# Patient Record
Sex: MALE | Race: WHITE
[De-identification: names, ages, dates, MRNs, and addresses within clinical notes are randomized per-mention and may not be internally consistent; named-entity substitution may affect disease eponyms.]

---

## 2023-08-07 ENCOUNTER — HOSPITAL ENCOUNTER (INPATIENT)
Dept: HOSPITAL 97 - ER | Age: 58
LOS: 2 days | Discharge: HOME | DRG: 871 | End: 2023-08-09
Attending: HOSPITALIST | Admitting: HOSPITALIST
Payer: COMMERCIAL

## 2023-08-07 VITALS — BODY MASS INDEX: 20.3 KG/M2

## 2023-08-07 DIAGNOSIS — K70.0: ICD-10-CM

## 2023-08-07 DIAGNOSIS — E87.1: ICD-10-CM

## 2023-08-07 DIAGNOSIS — M79.89: ICD-10-CM

## 2023-08-07 DIAGNOSIS — D50.9: ICD-10-CM

## 2023-08-07 DIAGNOSIS — K62.4: ICD-10-CM

## 2023-08-07 DIAGNOSIS — Z79.899: ICD-10-CM

## 2023-08-07 DIAGNOSIS — E43: ICD-10-CM

## 2023-08-07 DIAGNOSIS — R74.01: ICD-10-CM

## 2023-08-07 DIAGNOSIS — Z20.822: ICD-10-CM

## 2023-08-07 DIAGNOSIS — A41.9: Primary | ICD-10-CM

## 2023-08-07 DIAGNOSIS — E87.6: ICD-10-CM

## 2023-08-07 DIAGNOSIS — J18.9: ICD-10-CM

## 2023-08-07 DIAGNOSIS — M10.9: ICD-10-CM

## 2023-08-07 LAB
ALBUMIN SERPL BCP-MCNC: 2.5 G/DL (ref 3.4–5)
ALP SERPL-CCNC: 167 U/L (ref 45–117)
ALT SERPL W P-5'-P-CCNC: 26 U/L (ref 16–61)
ANISOCYTOSIS BLD QL: (no result)
AST SERPL W P-5'-P-CCNC: 42 U/L (ref 15–37)
BLD SMEAR INTERP: (no result)
BUN BLD-MCNC: < 3 MG/DL (ref 7–18)
GLUCOSE SERPLBLD-MCNC: 118 MG/DL (ref 74–106)
HCT VFR BLD CALC: 33 % (ref 39.6–49)
INR BLD: 1.09
LIPASE SERPL-CCNC: 28 U/L (ref 13–75)
LYMPHOCYTES # SPEC AUTO: 2.4 K/UL (ref 0.7–4.9)
MAGNESIUM SERPL-MCNC: 2.3 MG/DL (ref 1.6–2.4)
MCV RBC: 80.1 FL (ref 80–100)
MORPHOLOGY BLD-IMP: (no result)
NT-PROBNP SERPL-MCNC: 138 PG/ML (ref ?–125)
PMV BLD: 7.4 FL (ref 7.6–11.3)
POTASSIUM SERPL-SCNC: 3.3 MEQ/L (ref 3.5–5.1)
RBC # BLD: 4.12 M/UL (ref 4.33–5.43)
TROPONIN I SERPL HS-MCNC: 5.5 PG/ML (ref ?–58.9)
WBC # BLD AUTO: 8.4 THOU/UL (ref 4.3–10.9)

## 2023-08-07 PROCEDURE — 83605 ASSAY OF LACTIC ACID: CPT

## 2023-08-07 PROCEDURE — 84484 ASSAY OF TROPONIN QUANT: CPT

## 2023-08-07 PROCEDURE — 85044 MANUAL RETICULOCYTE COUNT: CPT

## 2023-08-07 PROCEDURE — 87177 OVA AND PARASITES SMEARS: CPT

## 2023-08-07 PROCEDURE — 74177 CT ABD & PELVIS W/CONTRAST: CPT

## 2023-08-07 PROCEDURE — 86200 CCP ANTIBODY: CPT

## 2023-08-07 PROCEDURE — 83880 ASSAY OF NATRIURETIC PEPTIDE: CPT

## 2023-08-07 PROCEDURE — 85610 PROTHROMBIN TIME: CPT

## 2023-08-07 PROCEDURE — 83540 ASSAY OF IRON: CPT

## 2023-08-07 PROCEDURE — 80053 COMPREHEN METABOLIC PANEL: CPT

## 2023-08-07 PROCEDURE — 81003 URINALYSIS AUTO W/O SCOPE: CPT

## 2023-08-07 PROCEDURE — 84443 ASSAY THYROID STIM HORMONE: CPT

## 2023-08-07 PROCEDURE — 87040 BLOOD CULTURE FOR BACTERIA: CPT

## 2023-08-07 PROCEDURE — 89055 LEUKOCYTE ASSESSMENT FECAL: CPT

## 2023-08-07 PROCEDURE — 87324 CLOSTRIDIUM AG IA: CPT

## 2023-08-07 PROCEDURE — 87045 FECES CULTURE AEROBIC BACT: CPT

## 2023-08-07 PROCEDURE — 82550 ASSAY OF CK (CPK): CPT

## 2023-08-07 PROCEDURE — 36415 COLL VENOUS BLD VENIPUNCTURE: CPT

## 2023-08-07 PROCEDURE — 82607 VITAMIN B-12: CPT

## 2023-08-07 PROCEDURE — 86140 C-REACTIVE PROTEIN: CPT

## 2023-08-07 PROCEDURE — 82533 TOTAL CORTISOL: CPT

## 2023-08-07 PROCEDURE — 93970 EXTREMITY STUDY: CPT

## 2023-08-07 PROCEDURE — 71275 CT ANGIOGRAPHY CHEST: CPT

## 2023-08-07 PROCEDURE — 86038 ANTINUCLEAR ANTIBODIES: CPT

## 2023-08-07 PROCEDURE — 86430 RHEUMATOID FACTOR TEST QUAL: CPT

## 2023-08-07 PROCEDURE — 86225 DNA ANTIBODY NATIVE: CPT

## 2023-08-07 PROCEDURE — 83690 ASSAY OF LIPASE: CPT

## 2023-08-07 PROCEDURE — 93005 ELECTROCARDIOGRAM TRACING: CPT

## 2023-08-07 PROCEDURE — 87804 INFLUENZA ASSAY W/OPTIC: CPT

## 2023-08-07 PROCEDURE — 87635 SARS-COV-2 COVID-19 AMP PRB: CPT

## 2023-08-07 PROCEDURE — 85025 COMPLETE CBC W/AUTO DIFF WBC: CPT

## 2023-08-07 PROCEDURE — 87209 SMEAR COMPLEX STAIN: CPT

## 2023-08-07 PROCEDURE — 87046 STOOL CULTR AEROBIC BACT EA: CPT

## 2023-08-07 PROCEDURE — 71045 X-RAY EXAM CHEST 1 VIEW: CPT

## 2023-08-07 PROCEDURE — 99285 EMERGENCY DEPT VISIT HI MDM: CPT

## 2023-08-07 PROCEDURE — 84100 ASSAY OF PHOSPHORUS: CPT

## 2023-08-07 PROCEDURE — 84439 ASSAY OF FREE THYROXINE: CPT

## 2023-08-07 PROCEDURE — 83735 ASSAY OF MAGNESIUM: CPT

## 2023-08-07 PROCEDURE — 85730 THROMBOPLASTIN TIME PARTIAL: CPT

## 2023-08-07 RX ADMIN — ZOLPIDEM TARTRATE SCH MG: 5 TABLET, FILM COATED ORAL at 22:15

## 2023-08-07 RX ADMIN — SODIUM CHLORIDE SCH MLS: 0.9 INJECTION, SOLUTION INTRAVENOUS at 22:15

## 2023-08-07 RX ADMIN — Medication SCH ML: at 22:15

## 2023-08-07 NOTE — ER
Nurse's Notes                                                                                     

 [facility]                                                                 

Name: Kristian Sousa                                                                                 

Age: 57 yrs                                                                                       

Sex: Male                                                                                         

: 1965                                                                                   

MRN: [id number]                                                                                   

Arrival Date: 2023                                                                          

Time: 16:24                                                                                       

Account#: [id number]                                                                            

Bed 6                                                                                             

Private MD:                                                                                       

Diagnosis: Severe sepsis with septic shock;Other pneumonia, unspecified organism                  

                                                                                                  

Presentation:                                                                                     

                                                                                             

16:47 Chief complaint: Patient states: cough, left leg swelling X5 months and bilateral leg   cm10

      rash that is intermittent. Coronavirus screen: Client denies travel out of the [place] in      

      the last 14 days. Ebola Screen: No symptoms or risks identified at this time. Initial       

      Sepsis Screen: Does the patient meet any 2 criteria? HR > 90 bpm. Does the patient have     

      a suspected source of infection? No. Patient's initial sepsis screen is negative. Risk      

      Assessment: Do you want to hurt yourself or someone else? Patient reports no desire to      

      harm self or others. Onset of symptoms was 2023.                                 

16:47 Method Of Arrival: Ambulatory                                                           cm10

16:47 Acuity: LUANN 3                                                                           cm10

                                                                                                  

Historical:                                                                                       

- Allergies:                                                                                      

16:49 No Known Allergies;                                                                     cm10

- PMHx:                                                                                           

17:10 None;                                                                                   aa5 

                                                                                                  

- Immunization history:: Adult Immunizations unknown.                                             

- Social history:: Smoking status: Patient denies any tobacco usage or history of.                

  Patient uses alcohol, on a daily basis. claims drinking about a 6 pack/day.                     

                                                                                                  

                                                                                                  

Screenin:30 [facility] ED Fall Risk Assessment (Adult) History of falling in the last 3 months,       aa5 

      including since admission No falls in past 3 months (0 pts) Confusion or Disorientation     

      No (0 pts) Intoxicated or Sedated No (0 pts) Impaired Gait No (0 pts) Mobility Assist       

      Device Used No (0 pt) Altered Elimination No (0 pt) Score/Fall Risk Level 0 - 2 = Low       

      Risk Oriented to surroundings, Maintained a safe environment, Educated pt \T\ family on     

      fall prevention, incl call for assistance when getting out of bed. Abuse screen: Denies     

      threats or abuse. Nutritional screening: no appetite . Tuberculosis screening: No           

      symptoms or risk factors identified.                                                        

                                                                                                  

Assessment:                                                                                       

17:00 General: Appears uncomfortable, Behavior is calm, cooperative. Pain: Denies pain.       aa5 

      Neuro: Level of Consciousness is awake, alert, obeys commands, Oriented to person,          

      place, time, situation. Cardiovascular: Heart tones S1 S2 present Edema present to left     

      lower leg Rhythm is regular. Respiratory: Reports cough that is dry, Airway is patent       

      Respiratory effort is even, unlabored, Respiratory pattern is regular, symmetrical,         

      Breath sounds are diminished bilaterally. Onset: The symptoms/episode began/occurred 5      

      days ago, Denies shortness of breath. GI: Abdomen is flat, non-distended, Bowel sounds      

      present X 4 quads. Abd is soft and non tender X 4 quads. Reports no appetite Patient        

      currently denies diarrhea, nausea, vomiting. : No signs and/or symptoms were reported     

      regarding the genitourinary system. EENT: No signs and/or symptoms were reported            

      regarding the EENT system. Derm: Skin is pink, warm \T\ dry. Rash noted that is red         

      raised and noted to edelmira arms, edelmira legs, torso, abdomen, and back. Pt reports rash began     

      5 months ago to left leg and spread throughout body 5 days ago. Pt reports rash is          

      itchy and he has been unable to sleep well. Musculoskeletal: Range of motion: intact in     

      all extremities.                                                                            

17:49 Reassessment: x-ray completed, US currently at bedside. .                               aa5 

18:00 Reassessment: Patient is alert, oriented x 3, equal unlabored respirations, skin        aa5 

      warm/dry/pink. Patient states feeling better. Denies itching. .                             

18:11 Reassessment: Pt to CT via stretcher .                                                  aa5 

19:00 Reassessment: Patient is alert, oriented x 3, equal unlabored respirations, skin        aa5 

      warm/dry/pink.                                                                              

19:54 Reassessment: Patient appears in no apparent distress at this time. Patient and/or      jb4 

      family updated on plan of care and expected duration. Pain level reassessed. Patient is     

      alert, oriented x 3, equal unlabored respirations, skin warm/dry/pink. ER provider at       

      the bedside.                                                                                

21:10 Reassessment: Patient appears in no apparent distress at this time. Patient and/or      jb4 

      family updated on plan of care and expected duration. Pain level reassessed. Patient is     

      alert, oriented x 3, equal unlabored respirations, skin warm/dry/pink.                      

                                                                                                  

Vital Signs:                                                                                      

16:47  / 80; Pulse 122; Resp 20; Temp 99.7; Pulse Ox 100% on R/A; Height 6 ft. 4 in. ;  cm10

17:30 Pulse 124; Resp 20 S; Temp 98.1(O); Pulse Ox 100% on R/A;                               aa5 

17:59 Weight 75.75 kg (M);                                                                    aa5 

18:05  / 71; Pulse 114; Resp 19 S; Pulse Ox 100% on R/A;                                aa5 

19:00  / 71; Pulse 108; Resp 18 S; Pulse Ox 100% on R/A;                                aa5 

19:54  / 71; Pulse 115; Resp 19; Pulse Ox 100% on R/A;                                  jb4 

21:10  / 72; Pulse 107; Resp 17; Pulse Ox 98% on R/A;                                   jb4 

17:59 Body Mass Index 20.33 (75.75 kg, 193.04 cm)                                             aa5 

                                                                                                  

ED Course:                                                                                        

16:27 Patient arrived in ED.                                                                  rg4 

16:34 Alfredo Townsend PA is PHCP.                                                                cp  

16:34 Ben Begum DO is Attending Physician.                                                cp  

16:49 Triage completed.                                                                       cm10

16:50 Arm band placed on Patient placed in an exam room, on a stretcher.                      cm10

16:57 Prabha Chamorro, REFUGIO is Primary Nurse.                                                   aa5 

17:00 Patient has correct armband on for positive identification. Placed in gown. Bed in low  aa5 

      position. Call light in reach. Side rails up X2. Client placed on continuous cardiac        

      and pulse oximetry monitoring. NIBP monitoring applied.                                     

17:18 Initial lab(s) drawn, by me, sent to lab. First set of blood cultures drawn by me.      aa5 

17:20 Inserted saline lock: 20 gauge in right forearm, using aseptic technique.               aa5 

17:30 Inserted saline lock: 20 gauge in right antecubital area, using aseptic technique.      aa5 

17:32 Second set of blood cultures drawn by me.                                               aa5 

17:51 XRAY Chest (1 view) In Process Unspecified.                                             EDMS

18:01 US Extremity Venous W Compression Edelmira In Process Unspecified.                           EDMS

18:23 CT Chest For PE Angio In Process Unspecified.                                           EDMS

18:23 CT Abd/Pelvis - IV Contrast Only In Process Unspecified.                                EDMS

18:24 No provider procedures requiring assistance completed.                                  aa5 

19:00 Report given to REFUGIO [place].                                                            aa5 

19:43 Nelly Avila is Hospitalizing Provider.                                               cp  

21:35 Patient admitted, IV remains in place.                                                  jb4 

                                                                                                  

Administered Medications:                                                                         

17:20 Drug: Levalbuterol Inhalation 1.25 mg Route: Inhalation;                                aa5 

17:20 Drug: MethylPrednisoLONE  mg Route: IVP; Site: right forearm;                    aa5 

18:00 Follow up: Response: No adverse reaction                                                aa5 

17:20 Drug: diphenhydrAMINE IVP 25 mg Route: IVP; Site: right forearm;                        aa5 

18:00 Follow up: Response: No adverse reaction; Marked relief of symptoms                     aa5 

18:05 Drug: NS 0.9% IV (30 ml/kg) 30 ml/kg Route: IV; Rate: bolus; Site: right antecubital;   aa5 

21:08 Follow up: IV Status: Completed infusion; IV Intake: 2250ml                             rv  

19:53 Drug: Rocephin IV 1 grams Route: IV; Rate: calculated rate; Site: right forearm;        jb4 

21:08 Follow up: Response: No adverse reaction; IV Status: Completed infusion                 rv  

19:53 Drug: Zithromax IVPB 500 mg Route: IVPB; Infused Over: 1 hrs; Site: right forearm;      jb4 

21:09 Follow up: Response: No adverse reaction; IV Status: Completed infusion; IV Intake:     rv  

      250ml                                                                                       

                                                                                                  

                                                                                                  

Intake:                                                                                           

21:08 IV: 2250ml; Total: 2250ml.                                                              rv  

21:09 IV: 250ml; Total: 2500ml.                                                               rv  

                                                                                                  

Outcome:                                                                                          

19:44 Decision to Hospitalize by Provider.                                                    cp  

21:34 Admitted to Med/surg accompanied by tech, via wheelchair, room 232, with chart, Report  jb4 

      called to  REFUGIO Gill                                                                        

21:34 Condition: stable                                                                           

21:34 Discharge instructions given to patient, Instructed on the need for admit, Demonstrated     

      understanding of instructions.                                                              

21:36 Patient left the ED.                                                                    jb4 

                                                                                                  

Signatures:                                                                                       

Dispatcher [employer]                           EDMS                                                 

Prabha Chamorro RN                     RN   aa5                                                  

Alfredo Townsend PA PA cp Garcia, Rubi                                 rg4                                                  

Kristian Cast RN                       RN   jb4                                                  

Basilio Olson RN RN   rv                                                   

Apryl Schaefer RN                  RN   cm10                                                 

                                                                                                  

**************************************************************************************************

## 2023-08-07 NOTE — P.HP
Certification for Inpatient


Patient admitted to: Inpatient


With expected LOS: <2 Midnights


Practitioner: I am a practitioner with admitting privileges, knowledge of 

patient current condition, hospital course, and medical plan of care.


Services: Services provided to patient in accordance with Admission requirements

found in Title 42 Section 412.3 of the Code of Federal Regulations





Patient History


Date of Service: 08/08/23


Reason for admission: leg swelling


History of Present Illness: 








57-year-old  male with a past medical history of alcohol use presents 

to the emergency room with left lower extremity swelling.  He reports left lower

extremity swelling that has been chronic for the last couple months, ultrasound 

negative for DVT, he is being treated for gout.  He reports associated cough and

shortness of breath for the last couple days.  He reports being treated with 

prednisone, he just"finished clindamycin for tooth." he reports being on 

doxycycline for the last 4 months.  He denies history of smoking, he reports 

alcohol use for the last 30 years, drinks 2 sixpacks a day.  He reports he is 

retired does not work reports anxiety from being away from the house.  Reports 

he has never been admitted to the hospital. Plan to admit for Sepsis secondary 

to pneumonia, hyponatremia, 





Lab evaluation


WBC WBC WNL, left shift 38.7, microcytic anemia 10.1, 33.0, CMP Mild hypokalemia

K 3.3, elevated AST 42, Urine unremarkable, Covid neg, CXR IMPRESSION: Mild left

and mild-to-moderate right patchy lung opacities may represent pneumonia, Venous

doppler IMPRESSION: No evidence of deep venous thrombosis involving either lower

extremity.





 CT abdomen pelvis IMPRESSION: 3 centimeter soft tissue structure right aspect 

of the proximal rectum. This probably 


represents 2 portions of the rectum which abut each other giving the appearance 

of a pseudo mass rather 


than a true mass. However, it is recommended that this be confirmed with a CT 

pelvis with rectal contrast.


Hepatomegaly with fatty infiltration, Mild thickening of the superior wall of 

the bladder may be secondary to a chronic bladder outlet 


obstruction. Inflammation and mass can also have this appearance


Allergies





No Known Allergies Allergy (Unverified 08/07/23 21:25)


   








- Past Medical/Surgical History


Has patient received pneumonia vaccine in the past: No


Diabetic: No


-: Gout


-: Hand Surgery





- Social History


Smoking Status: Never smoker


Alcohol use: Yes


CD- Drugs: No


Caffeine use: No


Place of Residence: Home





Review of Systems


10-point ROS is otherwise unremarkable





Physical Examination





- Vital Signs


Temperature: 98.1 F


Blood Pressure: 120/72


Pulse: 107


Respirations: 17





- Physical Exam


General: Alert, In no apparent distress, Oriented x3


HEENT: Atraumatic, Normocephalic, PERRLA


Neck: Supple, 2+ carotid pulse no bruit, JVD not distended


Respiratory: Normal air movement, Other (nonproductive cough)


Cardiovascular: Other (LLE +1), Edema


Gastrointestinal: Normal bowel sounds, Soft and benign


Musculoskeletal: No clubbing, No swelling


Neurological: Normal speech, Normal strength at 5/5 x4 extr, Cranial nerves 3-12

intact





- Studies


Laboratory Data (last 24 hrs)











  08/07/23 08/07/23 08/07/23





  17:18 17:18 17:18


 


WBC    8.40


 


Hgb    10.1 L


 


Hct    33.0 L


 


Plt Count    357


 


PT  12.0  


 


INR  1.09  


 


APTT  28.8  


 


Sodium   130 L 


 


Potassium   3.3 L 


 


BUN   < 3 L 


 


Creatinine   0.71 


 


Glucose   118 H 


 


Magnesium   2.3 


 


Total Bilirubin   0.3 


 


AST   42 H 


 


ALT   26 


 


Alkaline Phosphatase   167 H 


 


Lipase   28 








Microbiology Data (last 24 hrs): 








08/07/23 19:45   Nasopharnyx   Influenza Type A Antigen Screen - Final


08/07/23 19:45   Nasopharnyx   Influenza Type B Antigen Screen - Final








Assessment and Plan





- Plan


Assessment and plan


Sepsis secondary to pneumonia wo shock, tachycardia, elev lactic, 


Left lower extremity leg swelling


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


Hepatomegaly with fatty liver


Transaminitis likely secondary to fatty liver/alcohol use


Alcohol use


hyponatremia


Microcytic anemia


DVT prophylaxis








Assessment and plan


Sepsis secondary to pneumonia wo shock


IV fluids, IV antibiotics, nebs,


Blood cultures, trend WBCs, trend lactic


WBC WNL, left shift 38.7, Lactic 4.3, repeat 3.1,


CXR IMPRESSION: Mild left and mild-to-moderate right patchy lung opacities may 

represent pneumonia,


Covid neg, 


Infectious disease consult





Left lower extremity leg swelling


Venous doppler IMPRESSION: No evidence of deep venous thrombosis involving 

either lower extremity.





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


 CT abdomen pelvis IMPRESSION: 3 centimeter soft tissue structure right aspect 

of the proximal rectum. This probably 


represents 2 portions of the rectum which abut each other giving the appearance 

of a pseudo mass rather 


than a true mass. However, it is recommended that this be confirmed with a CT 

pelvis with rectal contrast.


Hepatomegaly with fatty infiltration, Mild thickening of the superior wall of 

the bladder may be secondary to a chronic bladder outlet 


obstruction. Inflammation and mass can also have this appearance


Will need follow-up outpatient for abnormal CT for colonoscopy





Hepatomegaly with fatty liver


Transaminitis


Alcohol use


elevated AST 42


CIWA protocol,





hyponatremia


CMP Mild hypokalemia K 3.3, 


Trend electrolytes replace as needed





Microcytic anemia


microcytic anemia 10.1, 33.0,





DVT prophylaxis Lovenox


Cardiac diet


Full code








Discharge Plan: Home


Plan to discharge in: 48 Hours





- Advance Directives


Does patient have a Living Will: No


Does patient have a Durable POA for Healthcare: No





- Code Status/Comfort Care


Code Status: Full Code


Physician Review: Patient Assessed, Agree with Above Assessment and Plan


Critical Care: No


Time Spent Managing Pts Care (In Minutes): 50

## 2023-08-07 NOTE — RAD REPORT
EXAM DESCRIPTION:  [facility] Single View8/7/2023 5:49 pm

 

CLINICAL HISTORY:  cough

 

COMPARISON:  2021

 

FINDINGS:  Mild left and mild-to-moderate right patchy lung opacities.

 

Heart is normal size

 

IMPRESSION:  Mild left and mild-to-moderate right patchy lung opacities may represent pneumonia

## 2023-08-07 NOTE — EDPHYS
Physician Documentation                                                                           

 [facility]                                                                 

Name: Kristian Sousa                                                                                 

Age: 57 yrs                                                                                       

Sex: Male                                                                                         

: 1965                                                                                   

MRN: [id number]                                                                                   

Arrival Date: 2023                                                                          

Time: 16:24                                                                                       

Account#: [id number]                                                                            

Bed 6                                                                                             

Private MD:                                                                                       

ED Physician Ben Begum                                                                         

HPI:                                                                                              

                                                                                             

17:00 This 57 yrs old Male presents to ER via Ambulatory with complaints of Leg Swelling,     cp  

      Cough.                                                                                      

17:00 The patient presents with a rash, erythematous, swelling, tenderness.                   cp  

17:00 The complaints affect the left lower leg.                                               cp  

17:00 Onset: The symptoms/episode began/occurred 5 month(s) ago.                              cp  

17:00 Patient is a 57-year-old male with no significant past medical history. Patient         cp  

      presents to the emergency department with complaints of left lower leg swelling and         

      rash that has had for the past 5 months. Patient reports he has followed up with his        

      primary care doctor, Dr. Sousa, who has been treating him for gout with prescribed          

      colchicine and allopurinol. Patient reports he is also been taking doxycycline              

      antibiotic and clindamycin previously. Patient reports he has been prescribed several       

      rounds of steroids which he completed last week. Patient reports symptoms have              

      continued with no improvement of the rash in the left lower leg and since last week         

      seems like the rash has spread all over. Patient complains of generalized itching,          

      weight loss over the past several months, decreased appetite and intermittent diarrhea.     

      Patient denies fevers and/or night sweats.                                                  

                                                                                                  

Historical:                                                                                       

- Allergies:                                                                                      

16:49 No Known Allergies;                                                                     cm10

- PMHx:                                                                                           

17:10 None;                                                                                   aa5 

                                                                                                  

- Immunization history:: Adult Immunizations unknown.                                             

- Social history:: Smoking status: Patient denies any tobacco usage or history of.                

  Patient uses alcohol, on a daily basis. claims drinking about a 6 pack/day.                     

                                                                                                  

                                                                                                  

ROS:                                                                                              

17:05 Constitutional: Positive for weight loss, Negative for fever, poor PO intake.           cp  

17:05 Eyes: Negative for injury, pain, redness, and discharge.                                cp  

17:05 ENT: Negative for drainage from ear(s), ear pain, sore throat, difficulty swallowing,       

      difficulty handling secretions.                                                             

17:05 Cardiovascular: Negative for chest pain.                                                    

17:05 Respiratory: Positive for cough, shortness of breath.                                       

17:05 Abdomen/GI: Negative for abdominal pain, vomiting, diarrhea, constipation, black/tarry      

      stool, rectal bleeding.                                                                     

17:05 Back: Negative for pain at rest, pain with movement.                                        

17:05 Skin: Positive for rash, swelling, of the left leg.                                         

17:05 Neuro: Negative for altered mental status, dizziness, headache, weakness.                   

17:05 All other systems are negative.                                                             

                                                                                                  

Exam:                                                                                             

17:10 Constitutional: The patient appears in no acute distress, alert, awake,                 cp  

      non-diaphoretic, non-toxic, well developed, well nourished.                                 

17:10 Head/Face:  Normocephalic, atraumatic.                                                  cp  

17:10 Eyes: Periorbital structures: appear normal, Conjunctiva: normal, no exudate, no            

      injection, Sclera: no appreciated abnormality, Lids and lashes: appear normal,              

      bilaterally.                                                                                

17:10 ENT: External ear(s): are unremarkable, Nose: is normal, Mouth: Lips: moist, Oral           

      mucosa: pink and intact, moist, Posterior pharynx: is normal, airway is patent, no          

      erythema, no exudate.                                                                       

17:10 Chest/axilla: Palpation: is normal, no crepitus, no tenderness.                             

17:10 Cardiovascular: Rate: tachycardic, Rhythm: regular, Pulses: Pulses are 2+ in right          

      dorsalis pedis artery and left dorsalis pedis artery. JVD: is not appreciated.              

17:10 Respiratory: the patient does not display signs of respiratory distress,  Respirations:     

      normal, no use of accessory muscles, no retractions, labored breathing, is not present,     

      Breath sounds: are clear throughout, no decreased breath sounds, no stridor, no             

      wheezing.                                                                                   

17:10 Abdomen/GI: Bowel sounds: active, all quadrants, Palpation: abdomen is soft and             

      non-tender, in all quadrants.                                                               

17:10 Back: CVA tenderness, is absent.                                                            

17:10 Skin: rash can be described as erythematous, and is diffusely located.                      

17:10 Neuro: Orientation: to person, place \T\ time. Mentation: is normal, Motor: moves all       

      fours, strength is normal, Sensation: numbness, that is moderate, of the  left foot and     

      left lower leg, Gait: is steady.                                                            

17:15 ECG was reviewed by the Attending Physician.                                            cp  

                                                                                                  

Vital Signs:                                                                                      

16:47  / 80; Pulse 122; Resp 20; Temp 99.7; Pulse Ox 100% on R/A; Height 6 ft. 4 in. ;  cm10

17:30 Pulse 124; Resp 20 S; Temp 98.1(O); Pulse Ox 100% on R/A;                               aa5 

17:59 Weight 75.75 kg (M);                                                                    aa5 

18:05  / 71; Pulse 114; Resp 19 S; Pulse Ox 100% on R/A;                                aa5 

19:00  / 71; Pulse 108; Resp 18 S; Pulse Ox 100% on R/A;                                aa5 

19:54  / 71; Pulse 115; Resp 19; Pulse Ox 100% on R/A;                                  jb4 

21:10  / 72; Pulse 107; Resp 17; Pulse Ox 98% on R/A;                                   jb4 

17:59 Body Mass Index 20.33 (75.75 kg, 193.04 cm)                                             aa5 

                                                                                                  

MDM:                                                                                              

16:50 Patient medically screened.                                                               

19:26 Data reviewed: vital signs, nurses notes, lab test result(s), EKG, radiologic studies,  cp  

      CT scan, plain films. ED course: will treat for septic shock: A) source of infection:       

      pneumonia, B) SIRs criteria: HR >90, RR of 20, C) lactate of 4.3.                           

20:00 I considered the following discharge prescriptions or medication management in the        

      emergency department Medications were administered in the Emergency Department. See MAR.    

20:00 Differential Diagnosis: Bronchitis Influenza Viral Syndrome Pneumonia Other sepsis.       

      Consideration of Admission/Observation Patient was admitted/placed on observation.          

      Management of patient was discussed with the following: Hospitalist: Nelly Avila NP      

      will admit after discussion. Counseling: I had a detailed discussion with the patient       

      and/or guardian regarding: the historical points, exam findings, and any diagnostic         

      results supporting the discharge/admit diagnosis, lab results, radiology results, the       

      need for further work-up and treatment in the hospital.                                     

                                                                                                  

                                                                                             

17:00 Order name: Blood Culture Adult (2)                                                     cp  

                                                                                             

17:00 Order name: CBC with Diff; Complete Time: 18:11                                           

                                                                                             

17:48 Interpretation: Normal except: RBC 4.12; HGB 10.1; HCT 33.0; MCH 24.4; MCHC 30.5; RDW   cp  

      21.0; MPV 7.4; [place]% 38.7; EOSINOPHIL % 20.3; EOSA 1.7.                                      

17:00 Order name: CMP; Complete Time: 18:05                                                   cp  

                                                                                             

18:05 Interpretation: Normal except: ; K 3.3; CL 97; GLUC 118; BUN < 3; AST 42; ALK     cp  

      167; CA 8.1; ALB 2.5; GLOB 4.0; A/G 0.6.                                                    

                                                                                             

17:00 Order name: Lactate w/ 2H reflex if indic.; Complete Time: 17:51                        cp  

                                                                                             

17:51 Interpretation: Reviewed.                                                               cp  

                                                                                             

17:00 Order name: Protime (+inr); Complete Time: 17:47                                        cp  

                                                                                             

17:00 Order name: Ptt, Activated; Complete Time: 17:47                                        cp  

                                                                                             

17:00 Order name: Urinalysis w/ reflexes; Complete Time: 19:24                                cp  

                                                                                             

17:00 Order name: Lipase; Complete Time: 18:05                                                cp  

                                                                                             

17:00 Order name: BNP; Complete Time: 18:05                                                   cp  

                                                                                             

18:06 Interpretation: Abnormal: NT PRO-.                                               cp  

                                                                                             

17:00 Order name: Troponin High Sensitivity; Complete Time: 18:05                             cp  

                                                                                             

17:00 Order name: Magnesium; Complete Time: 18:05                                             cp  

                                                                                             

18:07 Order name: CBC Smear Scan; Complete Time: 18:11                                        EDMS

                                                                                             

18:46 Order name: COVID-19 SARS RT PCR                                                        cp  

                                                                                             

18:46 Order name: Influenza Screen (a \T\ B)                                                    cp

                                                                                             

20:25 Order name: Urinalysis w/ reflexes                                                      EDMS

                                                                                             

20:25 Order name: CBC with Automated Diff                                                     EDMS

                                                                                             

20:25 Order name: CBC with Automated Diff                                                     EDMS

                                                                                             

20:25 Order name: CBC with Automated Diff                                                     EDMS

                                                                                             

20:25 Order name: CBC with Automated Diff                                                     EDMS

                                                                                             

20:25 Order name: Comprehensive Metabolic Panel                                               EDMS

                                                                                             

20:25 Order name: Comprehensive Metabolic Panel                                               EDMS

                                                                                             

20:25 Order name: Comprehensive Metabolic Panel                                               EDMS

                                                                                             

20:25 Order name: Comprehensive Metabolic Panel                                               EDMS

                                                                                             

20:25 Order name: Magnesium                                                                   EDMS

                                                                                             

20:25 Order name: Magnesium                                                                   EDMS

                                                                                             

20:25 Order name: Magnesium                                                                   EDMS

                                                                                             

20:25 Order name: Magnesium                                                                   EDMS

                                                                                             

17:00 Order name: XRAY Chest (1 view); Complete Time: 18:45                                   cp  

                                                                                             

17:00 Order name: US Extremity Venous W Compression Oneal; Complete Time: 18:45                 cp  

                                                                                             

18:45 Interpretation: Report reviewed.                                                          

                                                                                             

17:50 Order name: CT Chest For PE Angio; Complete Time: 18:45                                 cp  

                                                                                             

17:50 Order name: CT Abd/Pelvis - IV Contrast Only; Complete Time: 19:06                      cp  

                                                                                             

19:07 Interpretation: Report reviewed.                                                          

                                                                                             

17:00 Order name: EKG; Complete Time: 17:01                                                   cp  

                                                                                             

20:22 Order name: CONS Physician Consult                                                      EDMS

                                                                                             

20:25 Order name: Regular                                                                     [facility]

                                                                                             

20:31 Order name: Delirium Tremens Prophylaxis-IV Meds                                        EDMS

                                                                                             

17:00 Order name: Cardiac monitoring; Complete Time: 17:23                                    cp  

                                                                                             

17:00 Order name: EKG - Nurse/Tech; Complete Time: 17:23                                      cp  

                                                                                             

17:00 Order name: IV Saline Lock - Large Bore; Complete Time: 17:23                           cp  

                                                                                             

17:00 Order name: Labs collected and sent; Complete Time: 17:23                               cp  

                                                                                             

17:00 Order name: O2 Per Protocol; Complete Time: 17:23                                       cp  

                                                                                             

17:00 Order name: O2 Sat Monitoring; Complete Time: 17:23                                     cp  

                                                                                             

17:00 Order name: Vital Signs; Complete Time: 17:23                                           cp  

                                                                                                  

EC:15 Rate is 112 beats/min. Rhythm is regular. OR interval is normal. QRS interval is        cp  

      normal. QT interval is normal. T waves are Inverted in lead aVR. Interpreted by me.         

      Reviewed by me.                                                                             

                                                                                                  

Administered Medications:                                                                         

17:20 Drug: Levalbuterol Inhalation 1.25 mg Route: Inhalation;                                aa5 

17:20 Drug: MethylPrednisoLONE  mg Route: IVP; Site: right forearm;                    aa5 

18:00 Follow up: Response: No adverse reaction                                                aa5 

17:20 Drug: diphenhydrAMINE IVP 25 mg Route: IVP; Site: right forearm;                        aa5 

18:00 Follow up: Response: No adverse reaction; Marked relief of symptoms                     aa5 

18:05 Drug: NS 0.9% IV (30 ml/kg) 30 ml/kg Route: IV; Rate: bolus; Site: right antecubital;   aa5 

21:08 Follow up: IV Status: Completed infusion; IV Intake: 2250ml                             rv  

19:53 Drug: Rocephin IV 1 grams Route: IV; Rate: calculated rate; Site: right forearm;        jb4 

21:08 Follow up: Response: No adverse reaction; IV Status: Completed infusion                 rv  

19:53 Drug: Zithromax IVPB 500 mg Route: IVPB; Infused Over: 1 hrs; Site: right forearm;      jb4 

21:09 Follow up: Response: No adverse reaction; IV Status: Completed infusion; IV Intake:     rv  

      250ml                                                                                       

                                                                                                  

                                                                                                  

Disposition:                                                                                      

19:58 Co-signature as Attending Physician, Ben SIMONS was immediately available on-site ms3 

      in the Emergency Department for consultation in the care of the patient.                    

                                                                                                  

Disposition Summary:                                                                              

23 19:44                                                                                    

Hospitalization Ordered                                                                           

      Hospitalization Status: Inpatient Admission                                             cp  

      Provider: Nelly Avila cp  

      [facility]                                                 cp  

      Condition: Stable                                                                       cp  

      Problem: new                                                                            cp  

      Symptoms: have improved                                                                 cp  

      Bed/Room Type: Standard                                                                 cp  

      Room Assignment: 232(23 21:09)                                                    cg  

      Diagnosis                                                                                   

        - Severe sepsis with septic shock                                                     cp  

        - Other pneumonia, unspecified organism                                               cp  

      Forms:                                                                                      

        - Medication Reconciliation Form                                                      cp  

        - SBAR form                                                                           cp  

Signatures:                                                                                       

Dispatcher [employer]                           Prabha Mcclain, RN                     RN   [id number]                                                  

Alfredo Townsend PA PA   cp                                                   

Namrata Vaz RN RN                                                      

Kristian Cast RN RN   [id number]                                                  

Ben Begum DO DO   [id number]                                                  

Apryl Schaefer RN                  RN   [id number]                                                 

Basilio Olson RN   rv                                                   

                                                                                                  

Corrections: (The following items were deleted from the chart)                                    

17:42 17:00 Accucheck ordered. cp                                                             aa5 

21:09 19:44 cp                                                                                cg  

                                                                                                  

**************************************************************************************************

## 2023-08-07 NOTE — RAD REPORT
EXAM DESCRIPTION:  CT - Abdomen   Pelvis W Contrast - 8/7/2023 6:21 pm

 

CLINICAL HISTORY:  Abdominal pain

 

COMPARISON:  none.

 

TECHNIQUE:  Computed axial tomography of the abdomen pelvis was obtained. 100 cc Isovue-300 was admin
istered intravenously. Oral contrast was not requested which limits evaluation of bowel and appendix

 

All CT scans are performed using dose optimization technique as appropriate and may include automated
 exposure control or mA/KV adjustment according to patient size.

 

FINDINGS:  Liver is enlarged with fatty infiltration.

 

Spleen, pancreas, adrenals and kidneys are unremarkable.

 

Normal appendix. No evidence diverticulitis.

 

3 centimeter soft tissue structure right aspect of the proximal rectum.

 

The superior wall of bladder is mildly thickened. Mild to moderate prostatic enlargement

 

IMPRESSION:  3 centimeter soft tissue structure right aspect of the proximal rectum. This probably re
presents 2 portions of the rectum which abut each other giving the appearance of a pseudo mass rather
 than a true mass. However, it is recommended that this be confirmed with a CT pelvis with rectal con
trast.

 

Hepatomegaly with fatty infiltration

 

Mild thickening of the superior wall of the bladder may be secondary to a chronic bladder outlet obst
ruction. Inflammation and mass can also have this appearance

## 2023-08-07 NOTE — RAD REPORT
EXAM DESCRIPTION:  CT - Chest For Pe Angio - 8/7/2023 6:21 pm

 

CLINICAL HISTORY:   sob

 

COMPARISON:  None.

 

TECHNIQUE:  Dynamically enhanced axial 3 mm thick images of the chest were obtained during administra
tion of 100 mL Isovue 370 IV contrast. Coronal and oblique reconstruction images were generated and r
eviewed. Exam utilizes a protocol for optimal evaluation of pulmonary arterial tree.

 

Maximum intensity projections 3D imaging was utilized

 

All CT scans are performed using dose optimization technique as appropriate and may include automated
 exposure control or mA/KV adjustment according to patient size.

 

FINDINGS:  A pulmonary embolus is not seen.

 

A thoracic aortic aneurysm is not noted.

 

A pleural effusion is not seen. A pericardial effusion is not seen.

 

Mild ground-glass opacities scattered throughout the left lung. Mild to moderate alveolar opacities r
ight lung.

 

Thickening of the wall of distal esophagus

 

IMPRESSION:  Negative for a pulmonary embolism.

 

Mild to moderate right and mild left ground-glass opacities within the lungs may indicate infection

 

Thickening of the wall of the distal esophagus is nonspecific. It may have represent inflammation or 
mass

## 2023-08-07 NOTE — RAD REPORT
EXAM DESCRIPTION:  USExtrem Venous W Compress Bil8/7/2023 6:00 pm

 

CLINICAL HISTORY:  Leg swelling

 

COMPARISON:  May 2023

 

FINDINGS:  The common femoral, superficial femoral, greater saphenous, popliteal and posterior tibial
 veins bilaterally are compressible and demonstrate augmentation.

 

Doppler demonstrates good flow.

 

Grayscale, color and spectral analysis performed on all vessels

 

IMPRESSION:  No evidence of deep venous thrombosis involving  either lower extremity.

## 2023-08-08 LAB
ALBUMIN SERPL BCP-MCNC: 2.1 G/DL (ref 3.4–5)
ALP SERPL-CCNC: 128 U/L (ref 45–117)
ALT SERPL W P-5'-P-CCNC: 20 U/L (ref 16–61)
AST SERPL W P-5'-P-CCNC: 24 U/L (ref 15–37)
BUN BLD-MCNC: < 3 MG/DL (ref 7–18)
GLUCOSE SERPLBLD-MCNC: 144 MG/DL (ref 74–106)
HCT VFR BLD CALC: 25.7 % (ref 39.6–49)
LYMPHOCYTES # SPEC AUTO: 0.7 K/UL (ref 0.7–4.9)
MAGNESIUM SERPL-MCNC: 2.3 MG/DL (ref 1.6–2.4)
MCV RBC: 80.2 FL (ref 80–100)
PMV BLD: 7.3 FL (ref 7.6–11.3)
POTASSIUM SERPL-SCNC: 3.7 MEQ/L (ref 3.5–5.1)
RBC # BLD: 3.2 M/UL (ref 4.33–5.43)
WBC # BLD AUTO: 3.5 THOU/UL (ref 4.3–10.9)

## 2023-08-08 RX ADMIN — PIPERACILLIN SODIUM AND TAZOBACTAM SODIUM SCH: 4; .5 INJECTION, POWDER, LYOPHILIZED, FOR SOLUTION INTRAVENOUS at 09:00

## 2023-08-08 RX ADMIN — ENOXAPARIN SODIUM SCH MG: 40 INJECTION SUBCUTANEOUS at 08:51

## 2023-08-08 RX ADMIN — Medication SCH MG: at 08:50

## 2023-08-08 RX ADMIN — SODIUM CHLORIDE SCH MLS: 0.9 INJECTION, SOLUTION INTRAVENOUS at 10:44

## 2023-08-08 RX ADMIN — Medication SCH ML: at 08:51

## 2023-08-08 RX ADMIN — SODIUM CHLORIDE SCH MLS: 9 INJECTION, SOLUTION INTRAVENOUS at 16:48

## 2023-08-08 RX ADMIN — Medication SCH: at 21:00

## 2023-08-08 RX ADMIN — FOLIC ACID SCH MG: 1 TABLET ORAL at 08:51

## 2023-08-08 RX ADMIN — SODIUM CHLORIDE SCH MLS: 0.9 INJECTION, SOLUTION INTRAVENOUS at 23:40

## 2023-08-08 RX ADMIN — ZOLPIDEM TARTRATE SCH: 5 TABLET, FILM COATED ORAL at 21:00

## 2023-08-08 RX ADMIN — MULTIVITAMIN TABLET SCH TAB: TABLET at 08:50

## 2023-08-08 RX ADMIN — SODIUM CHLORIDE SCH MLS: 0.9 INJECTION, SOLUTION INTRAVENOUS at 08:51

## 2023-08-08 RX ADMIN — PIPERACILLIN SODIUM AND TAZOBACTAM SODIUM SCH MLS: 4; .5 INJECTION, POWDER, LYOPHILIZED, FOR SOLUTION INTRAVENOUS at 04:54

## 2023-08-08 NOTE — P.CNS
Date of Consult: 08/08/23


Reason for Consult: sepsis, pneumonia


Chief Complaint: leg swelling


History of Present Illness: 





Patient is a 58 yo male with a past medical history of gout who presented to the

ED with complaints of shortness of breath and cough. He also reports left foot 

pain and swelling that has been present for the past few months. 


Patient admitted for sepsis, pneumonia and started on empiric antibiotics. ID 

was consulted. 


 


Allergies





No Known Allergies Allergy (Unverified 08/07/23 21:25)


   





Home Medications: 








Allopurinol 300 mg PO DAILY 08/08/23 


Colchicine 0.6 mg PO BID 08/08/23 


Doxycycline Hyclate 100 mg PO BID 08/08/23 


Methylprednisolone [Medrol dosepack] 4 mg PO DAILY 08/08/23 








- Past Medical/Surgical History


Diabetic: No


-: Gout


-: Hand Surgery





- Social History


Alcohol use: Yes


CD- Drugs: No


Caffeine use: No


Place of Residence: Home





Review of Systems


Respiratory: Cough, Shortness of Breath


Gastrointestinal: Other (decreased appetite)


Musculoskeletal: Foot Pain (left foot)


Integumentary: Rash (generalized, improving )





Physical Examination














Temp Pulse Resp BP Pulse Ox


 


 97.6 F   92 H  18   127/74   100 


 


 08/08/23 12:00  08/08/23 12:00  08/08/23 12:00  08/08/23 12:00  08/08/23 12:00








General: Alert, In no apparent distress, Oriented x3


HEENT: Atraumatic, Normocephalic


Neck: JVD not distended


Respiratory: Diminished


Cardiovascular: Regular rate/rhythm, Normal S1 S2, Edema (left foot edema)


Gastrointestinal: Normal bowel sounds


Musculoskeletal: No clubbing


Integumentary: No breakdown, Rash(es), Tenderness/swelling (left foot), Warmth 

(left foot)


Neurological: Normal speech, Normal tone





Laboratory Data 


- Reviewed





Imagings Data: 


- Reviewed








Conclusions/Impression: 





Problem List 


Sepsis secondary to Pneumonia


Anemia


Severe PCM 








Sepsis


secondary to bilateral pneumonia


- CT chest 8/07: " Negative for a pulmonary embolism.  Mild to moderate right 

and mild left ground-glass opacities within the lungs may indicate infection   

Thickening of the wall of the distal esophagus is nonspecific. It may have 

represent inflammation or mass. " 


Leukocytosis resolved. Afebrile. 


- Blood cultures 8/07: Pending


- Stool culture 8/08: Pending


- Influenza A&B 8/07: Negative. Covid negative. 





Left Lower Extremity Swelling


- 8/07 US: no evidence of DVT involving either lower extremity








Recommendations


- Pneumonia: Continue antibiotic therapy for 5-7 days. Continue current 

antibiotics. will reevaluate patient tomorrow and adjust as needed. 


- Patient reports productive cough. Obtain sputum culture if possible


- keep leg elevated


- nutritional supplementation as tolerated








Case discussed with ANDRE Escobar

## 2023-08-09 VITALS — TEMPERATURE: 97.9 F | DIASTOLIC BLOOD PRESSURE: 87 MMHG | SYSTOLIC BLOOD PRESSURE: 140 MMHG

## 2023-08-09 VITALS — OXYGEN SATURATION: 100 %

## 2023-08-09 LAB
ALBUMIN SERPL BCP-MCNC: 2.2 G/DL (ref 3.4–5)
ALP SERPL-CCNC: 112 U/L (ref 45–117)
ALT SERPL W P-5'-P-CCNC: 18 U/L (ref 16–61)
AST SERPL W P-5'-P-CCNC: 22 U/L (ref 15–37)
BUN BLD-MCNC: < 3 MG/DL (ref 7–18)
C DIFF GDH + TOXINS A+B STL QL IA.RAPID: (no result)
CRP SERPL-MCNC: 7.55 MG/L (ref ?–3)
GLUCOSE SERPLBLD-MCNC: 90 MG/DL (ref 74–106)
HCT VFR BLD CALC: 27.6 % (ref 39.6–49)
IRON SERPL-MCNC: 10 UG/DL (ref 65–175)
LYMPHOCYTES # SPEC AUTO: 1.9 K/UL (ref 0.7–4.9)
MAGNESIUM SERPL-MCNC: 2.2 MG/DL (ref 1.6–2.4)
MCV RBC: 80.5 FL (ref 80–100)
PMV BLD: 6.7 FL (ref 7.6–11.3)
POTASSIUM SERPL-SCNC: 3.6 MEQ/L (ref 3.5–5.1)
RBC # BLD: 3.44 M/UL (ref 4.33–5.43)
TSH SERPL DL<=0.05 MIU/L-ACNC: 1.93 UIU/ML (ref 0.36–3.74)
WBC # BLD AUTO: 8.2 THOU/UL (ref 4.3–10.9)

## 2023-08-09 RX ADMIN — SODIUM CHLORIDE SCH MLS: 0.9 INJECTION, SOLUTION INTRAVENOUS at 08:16

## 2023-08-09 RX ADMIN — Medication SCH MG: at 08:19

## 2023-08-09 RX ADMIN — ENOXAPARIN SODIUM SCH MG: 40 INJECTION SUBCUTANEOUS at 08:19

## 2023-08-09 RX ADMIN — MULTIVITAMIN TABLET SCH TAB: TABLET at 08:19

## 2023-08-09 RX ADMIN — FOLIC ACID SCH MG: 1 TABLET ORAL at 08:19

## 2023-08-09 RX ADMIN — SODIUM CHLORIDE SCH MLS: 9 INJECTION, SOLUTION INTRAVENOUS at 01:00

## 2023-08-09 RX ADMIN — SODIUM CHLORIDE SCH MLS: 9 INJECTION, SOLUTION INTRAVENOUS at 08:21

## 2023-08-09 RX ADMIN — Medication SCH ML: at 08:19

## 2023-08-09 NOTE — RAD REPORT
[facility]

 

CLINICAL HISTORY:  pneumonia

 

COMPARISON:  Chest Single View dated 8/7/2023; Chest Pa And Lat (2 Views) dated 5/8/2023

 

TECHNIQUE:   Portable AP view of the chest.

 

FINDINGS:  Improving subtle peripheral airspace opacities bilaterally, again more prominent on the ri
ght. No pneumothorax or effusion. The cardiomediastinal contours are unremarkable.

 

IMPRESSION:  Improving bilateral peripheral airspace opacities, may reflect improving pneumonia.

## 2023-08-09 NOTE — P.PN
Date of Service: 08/09/23


Chief Complaint: leg swelling


Subjective: 


Patient seen and examined at bedside. 


Denies any new or worsening complaints. 


No acute events reported overnight. 


Wife at bedside. 








Physical Examination














Temp Pulse Resp BP Pulse Ox


 


 97.9 F   87   18   140/87   97 


 


 08/09/23 08:00  08/09/23 08:00  08/09/23 08:00  08/09/23 08:00  08/09/23 08:00








General: Alert, In no apparent distress, Oriented x3


HEENT: Atraumatic, Normocephalic


Neck: JVD not distended


Respiratory: Diminished


Cardiovascular: Regular rate/rhythm, Normal S1 S2. Nonlabored respirations on 

room air. 


Gastrointestinal: Normal bowel sounds


Musculoskeletal: No clubbing. No swelling. 


Integumentary: No breakdown. No lesions. 


Neurological: Normal speech, Normal tone, normal affect. 





Laboratory Data 


- Reviewed





Microbiology Data


- Reviewed





Imagings Data: 


- Reviewed





Medication List: reviewed








Assessment and Plan





Problem List 


Sepsis secondary to Pneumonia


Anemia


Severe PCM 








Sepsis


secondary to Pneumonia 


- CT chest 8/07: " Negative for a pulmonary embolism.  Mild to moderate right 

and mild left ground-glass opacities within the lungs may indicate infection   

Thickening of the wall of the distal esophagus is nonspecific. It may have 

represent inflammation or mass. " 


- Blood cultures 8/07: No growth to date


- Influenza A&B 8/07: Negative. Covid negative. 


- Leukocytosis resolved. Afebrile. 


- XR chest 8/09: "Improving bilateral peripheral airspace opacities, may reflect

improving pneumonia."





Left Lower Extremity Swelling


- 8/07 US: no evidence of DVT involving either lower extremity





Recommendations


- Pneumonia: Continue antibiotic therapy for 7 days (started 8/08). Upon 

discharge, start on Augmentin PO to complete 7 day course of antibiotics (8/08- 

8/15)


- Nutritional supplementation as needed


- Follow up with PCP in 1-2 weeks








Case discussed with ANDRE Escobar

## 2023-08-09 NOTE — RAD REPORT
EXAM DESCRIPTION:  MRI - Ankle Left Wo Cont - 8/9/2023 1:32 pm

 

CLINICAL HISTORY:  ankle pain

 

COMPARISON:  No comparisons

 

 

TECHNIQUE:    Multiplanar multisequence MRI of the left ankle, obtained without IV contrast.

 

FINDINGS:  Pronounced subcutaneous soft tissue edema and swelling along the lower leg just above the 
level of the ankle. Mild subcutaneous edema and swelling along the ankle and the midfoot to hindfoot.
 No appreciable fluid collections within limits of noncontrast evaluation.

 

No evidence of marrow signal abnormality or findings to suggest ongoing acute osteomyelitis.

 

Mild ankle joint effusion.

 

Tibiotalar and subtalar articulations appear preserved.

 

Scattered changes of mild synovitis along the peroneus longus tendon, tibialis anterior, and tibialis
 posterior tendons.

 

The deltoid ligament complex is intact. The tibia spring ligament is intact. The ATFL is intact. The 
PTFL and CFL are intact. Syndesmotic ligaments are intact.

 

Intrinsic muscles of the foot are unremarkable.

 

 

IMPRESSION:  Soft tissue edema and swelling along the lower leg above and at the level of the ankle, 
as well as along the midfoot and hindfoot. Findings are most suggestive of cellulitis. Please correla
te clinically.

 

No findings to suggest acute osteomyelitis.

 

Mild ankle joint effusion.

## 2023-08-09 NOTE — EKG
Test Date:    2023-08-07               Test Time:    17:09:09

Technician:   JULIANE                                     

                                                     

MEASUREMENT RESULTS:                                       

Intervals:                                           

Rate:         112                                    

IN:           124                                    

QRSD:         92                                     

QT:           342                                    

QTc:          466                                    

Axis:                                                

P:            55                                     

IN:           124                                    

QRS:          68                                     

T:            26                                     

                                                     

INTERPRETIVE STATEMENTS:                                       

                                                     

Sinus tachycardia

Otherwise normal ECG

No previous ECG available for comparison



Electronically Signed On 08-09-23 18:11:51 CDT by Aristides Meng
[[email or web address]] : I have independently reviewed patient's CT on 06/05/2023\par